# Patient Record
Sex: FEMALE | Race: WHITE | URBAN - METROPOLITAN AREA
[De-identification: names, ages, dates, MRNs, and addresses within clinical notes are randomized per-mention and may not be internally consistent; named-entity substitution may affect disease eponyms.]

---

## 2017-06-12 ENCOUNTER — HOSPITAL ENCOUNTER (EMERGENCY)
Facility: CLINIC | Age: 32
Discharge: HOME | End: 2017-06-12
Attending: EMERGENCY MEDICINE | Admitting: EMERGENCY MEDICINE
Payer: COMMERCIAL

## 2017-06-12 ENCOUNTER — APPOINTMENT (OUTPATIENT)
Dept: GENERAL RADIOLOGY | Facility: CLINIC | Age: 32
End: 2017-06-12
Attending: EMERGENCY MEDICINE
Payer: COMMERCIAL

## 2017-06-12 ENCOUNTER — TRANSFERRED RECORDS (OUTPATIENT)
Dept: HEALTH INFORMATION MANAGEMENT | Facility: CLINIC | Age: 32
End: 2017-06-12

## 2017-06-12 VITALS — DIASTOLIC BLOOD PRESSURE: 68 MMHG | SYSTOLIC BLOOD PRESSURE: 104 MMHG | OXYGEN SATURATION: 95 %

## 2017-06-12 DIAGNOSIS — R79.89 ELEVATED LFTS: ICD-10-CM

## 2017-06-12 DIAGNOSIS — R07.1 CHEST PAIN ON BREATHING: ICD-10-CM

## 2017-06-12 DIAGNOSIS — R07.89 CHEST WALL PAIN: ICD-10-CM

## 2017-06-12 LAB
ALBUMIN SERPL-MCNC: 3.1 G/DL (ref 3.4–5)
ALP SERPL-CCNC: 79 U/L (ref 40–150)
ALT SERPL W P-5'-P-CCNC: 282 U/L (ref 0–50)
ANION GAP SERPL CALCULATED.3IONS-SCNC: 5 MMOL/L (ref 3–14)
AST SERPL W P-5'-P-CCNC: 328 U/L (ref 0–45)
BILIRUB SERPL-MCNC: 0.3 MG/DL (ref 0.2–1.3)
BUN SERPL-MCNC: 18 MG/DL (ref 7–30)
CALCIUM SERPL-MCNC: 8.2 MG/DL (ref 8.5–10.1)
CHLORIDE SERPL-SCNC: 106 MMOL/L (ref 94–109)
CO2 SERPL-SCNC: 29 MMOL/L (ref 20–32)
CREAT SERPL-MCNC: 0.67 MG/DL (ref 0.52–1.04)
D DIMER PPP FEU-MCNC: 0.3 UG/ML FEU (ref 0–0.5)
DIFFERENTIAL METHOD BLD: NORMAL
ERYTHROCYTE [DISTWIDTH] IN BLOOD BY AUTOMATED COUNT: 12.9 % (ref 10–15)
GFR SERPL CREATININE-BSD FRML MDRD: >90 ML/MIN/1.7M2
GLUCOSE SERPL-MCNC: 98 MG/DL (ref 70–99)
HCT VFR BLD AUTO: 36.1 % (ref 35–47)
HGB BLD-MCNC: 11.9 G/DL (ref 11.7–15.7)
LIPASE SERPL-CCNC: 123 U/L (ref 73–393)
MCH RBC QN AUTO: 27.6 PG (ref 26.5–33)
MCHC RBC AUTO-ENTMCNC: 33 G/DL (ref 31.5–36.5)
MCV RBC AUTO: 84 FL (ref 78–100)
PLATELET # BLD AUTO: 326 10E9/L (ref 150–450)
POTASSIUM SERPL-SCNC: 4 MMOL/L (ref 3.4–5.3)
PROT SERPL-MCNC: 6.9 G/DL (ref 6.8–8.8)
RBC # BLD AUTO: 4.31 10E12/L (ref 3.8–5.2)
SODIUM SERPL-SCNC: 140 MMOL/L (ref 133–144)
TROPONIN I SERPL-MCNC: <0.015 UG/L (ref 0–0.04)
WBC # BLD AUTO: 8.6 10E9/L (ref 4–11)

## 2017-06-12 PROCEDURE — 93005 ELECTROCARDIOGRAM TRACING: CPT

## 2017-06-12 PROCEDURE — 85025 COMPLETE CBC W/AUTO DIFF WBC: CPT

## 2017-06-12 PROCEDURE — 85379 FIBRIN DEGRADATION QUANT: CPT

## 2017-06-12 PROCEDURE — 80053 COMPREHEN METABOLIC PANEL: CPT

## 2017-06-12 PROCEDURE — 83690 ASSAY OF LIPASE: CPT

## 2017-06-12 PROCEDURE — 71020 XR CHEST 2 VW: CPT

## 2017-06-12 PROCEDURE — 99285 EMERGENCY DEPT VISIT HI MDM: CPT | Mod: 25

## 2017-06-12 PROCEDURE — 84484 ASSAY OF TROPONIN QUANT: CPT

## 2017-06-12 RX ORDER — ACETAMINOPHEN 500 MG
500 TABLET ORAL EVERY 6 HOURS PRN
Qty: 12 TABLET | Refills: 0 | Status: SHIPPED | OUTPATIENT
Start: 2017-06-12 | End: 2017-06-15

## 2017-06-12 NOTE — LETTER
[facility] EMERGENCY DEPARTMENT  [address]  [facility] [address]  [phone]      2017    Lara Maynard  No address on file.  There are no phone numbers on file.    : 1985      To Whom it may concern:    Lara Maynard was seen in our Emergency Department today, 2017.    Sincerely,      Che Connelly MD

## 2017-06-12 NOTE — ED AVS SNAPSHOT
[facility] Emergency Department    [address] [place] [address]    Phone:  [phone]    Fax:  [phone]                                       Lara Maynard   MRN: [id number]    Department:  [facility] Emergency Department   Date of Visit:  6/12/2017           Patient Information     Date Of Birth          1985        Your diagnoses for this visit were:     Chest pain on breathing     Elevated LFTs     Chest wall pain        You were seen by Che Connelly MD.      Follow-up Information     Follow up with [facility] [place] In 1 week.    Contact information:    [address] [facility] [id number] [phone]        Discharge References/Attachments     CHEST WALL PAIN, COSTOCHONDRITIS (ENGLISH)      24 Hour Appointment Hotline       To make an appointment at any [facility], call [phone] ([phone]). If you don't have a family doctor or clinic, we will help you find one. [facility] are conveniently located to serve the needs of you and your family.             Review of your medicines      START taking        Dose / Directions Last dose taken    acetaminophen 500 MG tablet   Commonly known as:  TYLENOL   Dose:  500 mg   Quantity:  12 tablet        Take 1 tablet (500 mg) by mouth every 6 hours as needed for pain   Refills:  0                Prescriptions were sent or printed at these locations (1 Prescription)                   Other Prescriptions                Printed at Department/Unit printer (1 of 1)         acetaminophen (TYLENOL) 500 MG tablet                Procedures and tests performed during your visit     CBC with platelets differential    Comprehensive metabolic panel    D dimer quantitative    Lipase    Troponin I    XR Chest 2 Views      Orders Needing Specimen Collection     None      Pending Results     Date and Time Order Name Status Description    6/12/2017 1549 XR Chest 2  Views Preliminary     6/12/2017 1200 D dimer quantitative In process             Pending Culture Results     No orders found from 6/10/2017 to 6/13/2017.            Pending Results Instructions     If you had any lab results that were not finalized at the time of your Discharge, you can call the ED Lab Result RN at [phone]. You will be contacted by this team for any positive Lab results or changes in treatment. The nurses are available 7 days a week from 10A to 6:30P.  You can leave a message 24 hours per day and they will return your call.        Test Results From Your Hospital Stay        6/12/2017  3:36 PM      Component Results     Component Value Ref Range & Units Status    WBC 8.6 4.0 - 11.0 10e9/L Final    RBC Count 4.31 3.8 - 5.2 10e12/L Final    Hemoglobin 11.9 11.7 - 15.7 g/dL Final    Hematocrit 36.1 35.0 - 47.0 % Final    MCV 84 78 - 100 fl Final    MCH 27.6 26.5 - 33.0 pg Final    MCHC 33.0 31.5 - 36.5 g/dL Final    RDW 12.9 10.0 - 15.0 % Final    Platelet Count 326 150 - 450 10e9/L Final    Diff Method Automated Method  Final         6/12/2017  3:38 PM      Component Results     Component Value Ref Range & Units Status    Sodium 140 133 - 144 mmol/L Final    Potassium 4.0 3.4 - 5.3 mmol/L Final    Chloride 106 94 - 109 mmol/L Final    Carbon Dioxide 29 20 - 32 mmol/L Final    Anion Gap 5 3 - 14 mmol/L Final    Glucose 98 70 - 99 mg/dL Final    Urea Nitrogen 18 7 - 30 mg/dL Final    Creatinine 0.67 0.52 - 1.04 mg/dL Final    GFR Estimate >90 >60 mL/min/1.7m2 Final    GFR Estimate If Black >90 >60 mL/min/1.7m2 Final    Calcium 8.2 (L) 8.5 - 10.1 mg/dL Final    Bilirubin Total 0.3 0.2 - 1.3 mg/dL Final    Albumin 3.1 (L) 3.4 - 5.0 g/dL Final    Protein Total 6.9 6.8 - 8.8 g/dL Final    Alkaline Phosphatase 79 40 - 150 U/L Final     (H) 0 - 50 U/L Final     (H) 0 - 45 U/L Final         6/12/2017  3:34 PM         6/12/2017  3:38 PM      Component Results     Component Value Ref Range &  Units Status    Lipase 123 73 - 393 U/L Final         6/12/2017  3:38 PM      Component Results     Component Value Ref Range & Units Status    Troponin I ES <0.015 0.000 - 0.045 ug/L Final         6/12/2017  4:01 PM      Narrative     CHEST TWO VIEWS June 12, 2017 3:52 PM     HISTORY: Shortness of breath.    COMPARISON: None.         Impression     IMPRESSION: Heart size is normal. No pleural effusion, pneumothorax,  or abnormal area of consolidation.                Clinical Quality Measure: Blood Pressure Screening     Your blood pressure was checked while you were in the emergency department today. The last reading we obtained was  BP: 104/68 . Please read the guidelines below about what these numbers mean and what you should do about them.  If your systolic blood pressure (the top number) is less than 120 and your diastolic blood pressure (the bottom number) is less than 80, then your blood pressure is normal. There is nothing more that you need to do about it.  If your systolic blood pressure (the top number) is 120-139 or your diastolic blood pressure (the bottom number) is 80-89, your blood pressure may be higher than it should be. You should have your blood pressure rechecked within a year by a primary care provider.  If your systolic blood pressure (the top number) is 140 or greater or your diastolic blood pressure (the bottom number) is 90 or greater, you may have high blood pressure. High blood pressure is treatable, but if left untreated over time it can put you at risk for heart attack, stroke, or kidney failure. You should have your blood pressure rechecked by a primary care provider within the next 4 weeks.  If your provider in the emergency department today gave you specific instructions to follow-up with your doctor or provider even sooner than that, you should follow that instruction and not wait for up to 4 weeks for your follow-up visit.        Thank you for choosing [facility]       Thank you for  "choosing [facility] for your care. Our goal is always to provide you with excellent care. Hearing back from our patients is one way we can continue to improve our services. Please take a few minutes to complete the written survey that you may receive in the mail after you visit with us. Thank you!        [employer] Information     [employer] lets you send messages to your doctor, view your test results, renew your prescriptions, schedule appointments and more. To sign up, go to [email or web address] . Click on \"Log in\" on the left side of the screen, which will take you to the Welcome page. Then click on \"Sign up Now\" on the right side of the page.     You will be asked to enter the access code listed below, as well as some personal information. Please follow the directions to create your username and password.     Your access code is: [email or web address]  Expires: 9/10/2017  4:00 PM     Your access code will  in 90 days. If you need help or a new code, please call your [facility] clinic or [phone].        Care EveryWhere ID     This is your Care EveryWhere ID. This could be used by other organizations to access your [facility] medical records  [id number]        After Visit Summary       This is your record. Keep this with you and show to your community pharmacist(s) and doctor(s) at your next visit.                  "

## 2017-06-12 NOTE — ED AVS SNAPSHOT
[facility] Emergency Department    [address]    [facility] [address]    Phone:  [phone]    Fax:  [phone]                                       Lara Maynard   MRN: [id number]    Department:  [facility] Emergency Department   Date of Visit:  6/12/2017           After Visit Summary Signature Page     I have received my discharge instructions, and my questions have been answered. I have discussed any challenges I see with this plan with the nurse or doctor.    ..........................................................................................................................................  Patient/Patient Representative Signature      ..........................................................................................................................................  Patient Representative Print Name and Relationship to Patient    ..................................................               ................................................  Date                                            Time    ..........................................................................................................................................  Reviewed by Signature/Title    ...................................................              ..............................................  Date                                                            Time

## 2017-06-13 LAB — INTERPRETATION ECG - MUSE: NORMAL

## 2017-06-13 NOTE — ED PROVIDER NOTES
History     Chief Complaint:  Chest pain    HPI   Lara Maynard is a 31 year old female here for evaluation of chest pain and shortness of breath.  She arrived during [facility] downtime and therefore past history was obtained from her and her program paperwork without ability to reference the electronic record (downtime Epic was also down). The patient reports having breakfast at 0815 this morning, and that the onset of her sharp substernal and pain with breathing began shortly thereafter. She was at a meeting for an hour and talked to her superior heading the meeting so she wouldn t have to speak, who then called the nurses line, then EMS. The patient also reports tingling and numbness in the back of her head and neck, with shooting pain down her left arm that is still present. She is using hormones (implanted in arm) and smokes, but denies any cough, runny nose, leg swelling, calf pain, or history of clots. Of note, the patient recently started a new medication for anxiety but denies other medication changes.      Allergies:  Keflex  Vicodin    Medications:    Reviewed in her paperwork, includes Ketorolac    Past Medical History:    Reviewed in her paperwork  She states negative for past DVT/PE    Past Surgical History:    No relevant PSHx    Family History:    Denies known early MI, aortic pathology    Social History:  Marital Status:   [2]  +tobacco    Review of Systems  Ten system ROS reviewed and is negative except as above    Physical Exam   First Vitals: Refer to downtime paperwork  Discharge Vitals after Epic was back up:  BP: 104/68  Heart Rate: 62  SpO2: 97 %    Physical Exam   Eyes:  Sclera white; Pupils are equal and round  ENT:    External ears and nares normal, no goiter  CV:  Regular rate and rhythm, No murmur     Tenderness along sternum partially reproducing symptoms    No BLE edema  Resp:  Breath sounds clear and equal bilaterally  GI:  Abdomen is soft, non-tender, non-distended    No  rebound tenderness or peritoneal features  MS:  Moves all extremities  Skin:  Warm and dry  Neuro: Speech is normal and fluent. No apparent deficit.      Emergency Department Course   ECG:  ECG:  ECG taken at 1111, ECG read at 1115  Normal sinus rhythm with sinus arrhythmia  Normal ECG  Vent rate 76, MI interval 172, QRS duration 104, QT/QTc 386/434, P-R-T axes -5 -7 40      Imaging:  IMPRESSION: Heart size is normal. No pleural effusion, pneumothorax,  or abnormal area of consolidation. Read per radiology after downtime ended    Laboratory (received by downtime procedures):  Metabolic panel: ,  o/w WNL  CBC: WBC: 8.55, HGB: 11.9, PLT: 326  Troponin: <0.015  D-dimer: 0.26    Interventions:  Aspirin 324mg PO    Emergency Department Course:  I performed an exam of the patient as documented above.  IV inserted and blood drawn. The patient was placed on continuous cardiac monitoring and pulse oximetry.   Patient informed of downtime and anticipated delays  Recheck after labs returned: discussed elevated LFTs, awaiting CXR  The patient was sent for a chest x-ray while in the emergency department, findings above.     Findings and plan explained to the Patient and caregiver. Patient discharged home with instructions regarding supportive care, medications, and reasons to return. The importance of close follow-up was reviewed.     Impression & Plan      Medical Decision Making:  Lara Maynard is a 31 year old female here for evaluation of chest pain. She has some tenderness on exam but this is not fully reproducible. Evaluation to evaluate for pericarditis, dysrhythmia, NSTEMI, pneumonia, pneumothorax was all normal.  On hormones but D-dimer returned negative.  No further evaluation for PE taken and CXR ordered.  Blood work was notable for mild elevation in her LFTs, however she does not have other symptoms referable to hepatitis. She denies alcohol or excessive tylenol use. This is something that can be  managed on an outpatient basis.  She is already on NSAIDs and low dose Tylenol was prescribed with maximum per day of 2g to avoid further liver changes.  She will be following up in clinic.    Diagnosis:    ICD-10-CM    1. Chest pain on breathing R07.1    2. Elevated LFTs R79.89    3. Chest wall pain R07.89        Discharge Medications:  Discharge Medication List as of 6/12/2017  4:05 PM      START taking these medications    Details   acetaminophen (TYLENOL) 500 MG tablet Take 1 tablet (500 mg) by mouth every 6 hours as needed for pain, Disp-12 tablet, R-0, Local Print               Che Connelly MD  6/12/2017   [facility] EMERGENCY DEPARTMENT       Che Connelly MD  06/13/17 8350

## 2020-12-04 ENCOUNTER — RECORDS - HEALTHEAST (OUTPATIENT)
Dept: ADMINISTRATIVE | Facility: OTHER | Age: 35
End: 2020-12-04

## 2020-12-14 ENCOUNTER — COMMUNICATION - HEALTHEAST (OUTPATIENT)
Dept: EDUCATION SERVICES | Facility: CLINIC | Age: 35
End: 2020-12-14

## 2020-12-15 ENCOUNTER — RECORDS - HEALTHEAST (OUTPATIENT)
Dept: ADMINISTRATIVE | Facility: OTHER | Age: 35
End: 2020-12-15

## 2021-05-30 ENCOUNTER — RECORDS - HEALTHEAST (OUTPATIENT)
Dept: ADMINISTRATIVE | Facility: CLINIC | Age: 36
End: 2021-05-30

## 2021-06-13 NOTE — TELEPHONE ENCOUNTER
Date: 1/4/2021 Status: [facility]   Time: 11:00 AM Length: 60   Visit Type: CONSULT [[id number]] Copay: $0.00   Provider: Caryl Maki RN